# Patient Record
Sex: FEMALE | Race: WHITE
[De-identification: names, ages, dates, MRNs, and addresses within clinical notes are randomized per-mention and may not be internally consistent; named-entity substitution may affect disease eponyms.]

---

## 2020-10-16 ENCOUNTER — NURSE TRIAGE (OUTPATIENT)
Dept: OTHER | Facility: CLINIC | Age: 25
End: 2020-10-16

## 2020-10-16 NOTE — TELEPHONE ENCOUNTER
Patient's location of employment:[employer]  Location of injury:pt room  Time of injury:1000  Last 4 of patient's LGV:5149  Location recommended for treatment:     Reason for Disposition   Back pain or stiffness from bending or twisting injury    Answer Assessment - Initial Assessment Questions  1. MECHANISM: \"How did the injury happen? \" (Consider the possibility of domestic violence or elder abuse)      Was in a pt room and was changing the pt and she strained her back while lifting up the pt.    2. ONSET: \"When did the injury happen? \" (Minutes or hours ago)   occurred this morning    3. LOCATION: \"What part of the back is injured? \"  Left lower back    4. SEVERITY: \"Can you move the back normally? \"     Able to move, still walking    5. PAIN: \"Is there any pain? \" If so, ask: \"How bad is the pain? \"   (Scale 1-10; or mild, moderate, severe)    Pain level is 2/10, pain is constant    6. CORD SYMPTOMS: Any weakness or numbness of the arms or legs? \"     No    7. SIZE: For cuts, bruises, or swelling, ask: \"How large is it? \" (e.g., inches or centimeters)   na    8. TETANUS: For any breaks in the skin, ask: \"When was the last tetanus booster? \"    Na    9. OTHER SYMPTOMS: \"Do you have any other symptoms? \" (e.g., abdominal pain, blood in urine)    No other symptoms    10. PREGNANCY: \"Is there any chance you are pregnant? \" \"When was your last menstrual period? \"       no    Protocols used: BACK INJURY-ADULT-[place]    Employee injury that occurred today. Was lifting a pt while changing him and strained her left lower back. Current pain is 2/10. No other symptoms. Will monitor symptoms and do home care. Will call back as needed. Needs to complete [employer].